# Patient Record
Sex: MALE | Race: WHITE | NOT HISPANIC OR LATINO | URBAN - METROPOLITAN AREA
[De-identification: names, ages, dates, MRNs, and addresses within clinical notes are randomized per-mention and may not be internally consistent; named-entity substitution may affect disease eponyms.]

---

## 2019-09-30 ENCOUNTER — EMERGENCY (EMERGENCY)
Facility: HOSPITAL | Age: 39
LOS: 1 days | Discharge: HOME | End: 2019-09-30
Attending: EMERGENCY MEDICINE | Admitting: EMERGENCY MEDICINE
Payer: SELF-PAY

## 2019-09-30 VITALS
HEIGHT: 70 IN | RESPIRATION RATE: 18 BRPM | TEMPERATURE: 98 F | HEART RATE: 106 BPM | SYSTOLIC BLOOD PRESSURE: 136 MMHG | WEIGHT: 164.91 LBS | DIASTOLIC BLOOD PRESSURE: 83 MMHG | OXYGEN SATURATION: 96 %

## 2019-09-30 LAB
ALBUMIN SERPL ELPH-MCNC: 4.5 G/DL — SIGNIFICANT CHANGE UP (ref 3.4–5)
ALP SERPL-CCNC: 81 U/L — SIGNIFICANT CHANGE UP (ref 40–120)
ALT FLD-CCNC: 31 U/L — SIGNIFICANT CHANGE UP (ref 12–42)
ANION GAP SERPL CALC-SCNC: 4 MMOL/L — LOW (ref 9–16)
AST SERPL-CCNC: 31 U/L — SIGNIFICANT CHANGE UP (ref 15–37)
BASOPHILS NFR BLD AUTO: 0.9 % — SIGNIFICANT CHANGE UP (ref 0–2)
BILIRUB SERPL-MCNC: 0.4 MG/DL — SIGNIFICANT CHANGE UP (ref 0.2–1.2)
BUN SERPL-MCNC: 11 MG/DL — SIGNIFICANT CHANGE UP (ref 7–23)
CALCIUM SERPL-MCNC: 9.1 MG/DL — SIGNIFICANT CHANGE UP (ref 8.5–10.5)
CHLORIDE SERPL-SCNC: 105 MMOL/L — SIGNIFICANT CHANGE UP (ref 96–108)
CO2 SERPL-SCNC: 30 MMOL/L — SIGNIFICANT CHANGE UP (ref 22–31)
CREAT SERPL-MCNC: 0.79 MG/DL — SIGNIFICANT CHANGE UP (ref 0.5–1.3)
EOSINOPHIL NFR BLD AUTO: 1.5 % — SIGNIFICANT CHANGE UP (ref 0–6)
GLUCOSE SERPL-MCNC: 93 MG/DL — SIGNIFICANT CHANGE UP (ref 70–99)
HCT VFR BLD CALC: 46.6 % — SIGNIFICANT CHANGE UP (ref 39–50)
HGB BLD-MCNC: 16.2 G/DL — SIGNIFICANT CHANGE UP (ref 13–17)
IMM GRANULOCYTES NFR BLD AUTO: 0.3 % — SIGNIFICANT CHANGE UP (ref 0–1.5)
LACTATE SERPL-SCNC: 0.7 MMOL/L — SIGNIFICANT CHANGE UP (ref 0.4–2)
LIDOCAIN IGE QN: 82 U/L — SIGNIFICANT CHANGE UP (ref 73–393)
LYMPHOCYTES # BLD AUTO: 21.3 % — SIGNIFICANT CHANGE UP (ref 13–44)
MCHC RBC-ENTMCNC: 31.7 PG — SIGNIFICANT CHANGE UP (ref 27–34)
MCHC RBC-ENTMCNC: 34.8 G/DL — SIGNIFICANT CHANGE UP (ref 32–36)
MCV RBC AUTO: 91.2 FL — SIGNIFICANT CHANGE UP (ref 80–100)
MONOCYTES NFR BLD AUTO: 8.4 % — SIGNIFICANT CHANGE UP (ref 2–14)
NEUTROPHILS NFR BLD AUTO: 67.6 % — SIGNIFICANT CHANGE UP (ref 43–77)
PCO2 BLDV: 54 MMHG — HIGH (ref 41–51)
PH BLDV: 7.37 — SIGNIFICANT CHANGE UP (ref 7.32–7.43)
PLATELET # BLD AUTO: 230 K/UL — SIGNIFICANT CHANGE UP (ref 150–400)
PO2 BLDV: 31 MMHG — LOW (ref 35–40)
POTASSIUM SERPL-MCNC: 4.4 MMOL/L — SIGNIFICANT CHANGE UP (ref 3.5–5.3)
POTASSIUM SERPL-SCNC: 4.4 MMOL/L — SIGNIFICANT CHANGE UP (ref 3.5–5.3)
PROT SERPL-MCNC: 8 G/DL — SIGNIFICANT CHANGE UP (ref 6.4–8.2)
RBC # BLD: 5.11 M/UL — SIGNIFICANT CHANGE UP (ref 4.2–5.8)
RBC # FLD: 11.9 % — SIGNIFICANT CHANGE UP (ref 10.3–14.5)
SAO2 % BLDV: 58 % — SIGNIFICANT CHANGE UP
SODIUM SERPL-SCNC: 139 MMOL/L — SIGNIFICANT CHANGE UP (ref 132–145)
WBC # BLD: 6.6 K/UL — SIGNIFICANT CHANGE UP (ref 3.8–10.5)
WBC # FLD AUTO: 6.6 K/UL — SIGNIFICANT CHANGE UP (ref 3.8–10.5)

## 2019-09-30 PROCEDURE — 99284 EMERGENCY DEPT VISIT MOD MDM: CPT

## 2019-09-30 RX ORDER — METOCLOPRAMIDE HCL 10 MG
10 TABLET ORAL ONCE
Refills: 0 | Status: COMPLETED | OUTPATIENT
Start: 2019-09-30 | End: 2019-09-30

## 2019-09-30 RX ORDER — ACETAMINOPHEN 500 MG
650 TABLET ORAL ONCE
Refills: 0 | Status: COMPLETED | OUTPATIENT
Start: 2019-09-30 | End: 2019-09-30

## 2019-09-30 RX ORDER — SODIUM CHLORIDE 9 MG/ML
1000 INJECTION INTRAMUSCULAR; INTRAVENOUS; SUBCUTANEOUS ONCE
Refills: 0 | Status: COMPLETED | OUTPATIENT
Start: 2019-09-30 | End: 2019-09-30

## 2019-09-30 RX ADMIN — Medication 30 MILLILITER(S): at 19:19

## 2019-09-30 RX ADMIN — Medication 10 MILLIGRAM(S): at 19:19

## 2019-09-30 RX ADMIN — SODIUM CHLORIDE 1000 MILLILITER(S): 9 INJECTION INTRAMUSCULAR; INTRAVENOUS; SUBCUTANEOUS at 19:20

## 2019-09-30 RX ADMIN — Medication 650 MILLIGRAM(S): at 19:19

## 2019-09-30 NOTE — ED PROVIDER NOTE - PATIENT PORTAL LINK FT
You can access the FollowMyHealth Patient Portal offered by [facility] by registering at the following website: [email or web address]. By joining [employer]’s FollowMyHealth portal, you will also be able to view your health information using other applications (apps) compatible with our system.

## 2019-09-30 NOTE — ED PROVIDER NOTE - PROGRESS NOTE DETAILS
Called Poison Control for recommendations, main things to follow up on are trouble w swallowing, chest pain, abd pain. Pt is symptom free, no distress, explained symptoms to observe for. Will DC w instrcutions

## 2019-09-30 NOTE — ED ADULT NURSE NOTE - OBJECTIVE STATEMENT
accidently swallowed Cafiza Professional Espresso . currently c/o headache and dry mouth. denies any chest pain/resp distress. well appearing, nad.

## 2019-09-30 NOTE — ED PROVIDER NOTE - CARE PROVIDER_API CALL
EMS
Collin Nicholson; MBBS)  Internal Medicine  [address] [place] [id number]  Phone: [phone]  Fax: [phone]  Follow Up Time:

## 2019-09-30 NOTE — ED ADULT NURSE NOTE - CHIEF COMPLAINT QUOTE
Patient accidently swallowed 1 big sip of espresso  1hr ago , Sodium percarbonate, c/o burning to throat and metallic taste.

## 2019-09-30 NOTE — ED ADULT NURSE NOTE - CHPI ED NUR SYMPTOMS NEG
no loss of consciousness/no nausea/no pain/no vomiting/no chills/no dizziness/no fever/no back pain/no decreased eating/drinking

## 2019-09-30 NOTE — ED ADULT NURSE NOTE - NSIMPLEMENTINTERV_GEN_ALL_ED
Implemented All Universal Safety Interventions:  [place] to call system. Call bell, personal items and telephone within reach. Instruct patient to call for assistance. Room bathroom lighting operational. Non-slip footwear when patient is off stretcher. Physically safe environment: no spills, clutter or unnecessary equipment. Stretcher in lowest position, wheels locked, appropriate side rails in place.

## 2019-09-30 NOTE — ED PROVIDER NOTE - OBJECTIVE STATEMENT
37 y/o Male with no significant PMHx presents to ED s/p ingesting cafiza, a coffee . Pt states he took one sip of the , mistaking it as coffee. After incident, pt drank water and induced vomiting. Currently 39 y/o Male with no significant PMHx presents to ED s/p ingesting cafiza, a coffee . Pt states he took one sip of the , mistaking it as coffee. After incident, pt drank water and induced vomiting. Currently having no chest pain, sob, abd pain or trouble swallowing. [facility] to  [facility] and told top come to ED for evaluation. Active component:sodium percarbonate and sodium carbonate.

## 2019-09-30 NOTE — ED PROVIDER NOTE - CLINICAL SUMMARY MEDICAL DECISION MAKING FREE TEXT BOX
39 y/o Male presents to ED s/p ingesting coffee . Consulted with poison control, who recommended symptomatic treatment. Will observe for abdominal pain, difficulty swallowing, and reassess.

## 2019-10-06 DIAGNOSIS — R11.10 VOMITING, UNSPECIFIED: ICD-10-CM

## 2019-10-06 DIAGNOSIS — T54.1X1A TOXIC EFFECT OF OTHER CORROSIVE ORGANIC COMPOUNDS, ACCIDENTAL (UNINTENTIONAL), INITIAL ENCOUNTER: ICD-10-CM

## 2024-03-21 ENCOUNTER — APPOINTMENT (EMERGENCY)
Dept: RADIOLOGY | Facility: HOSPITAL | Age: 44
End: 2024-03-21

## 2024-03-21 ENCOUNTER — HOSPITAL ENCOUNTER (EMERGENCY)
Facility: HOSPITAL | Age: 44
Discharge: HOME | End: 2024-03-21
Attending: EMERGENCY MEDICINE

## 2024-03-21 VITALS
DIASTOLIC BLOOD PRESSURE: 91 MMHG | HEART RATE: 93 BPM | SYSTOLIC BLOOD PRESSURE: 150 MMHG | TEMPERATURE: 98.7 F | RESPIRATION RATE: 20 BRPM | OXYGEN SATURATION: 96 %

## 2024-03-21 DIAGNOSIS — F41.9 ANXIETY: Primary | ICD-10-CM

## 2024-03-21 LAB
4HR DELTA HS TROPONIN: -2 NG/L
ALBUMIN SERPL BCP-MCNC: 4.8 G/DL (ref 3.5–5)
ALP SERPL-CCNC: 85 U/L (ref 34–104)
ALT SERPL W P-5'-P-CCNC: 39 U/L (ref 7–52)
ANION GAP SERPL CALCULATED.3IONS-SCNC: 10 MMOL/L (ref 4–13)
AST SERPL W P-5'-P-CCNC: 50 U/L (ref 13–39)
BASOPHILS # BLD AUTO: 0.07 THOUSANDS/ÂΜL (ref 0–0.1)
BASOPHILS NFR BLD AUTO: 1 % (ref 0–1)
BILIRUB SERPL-MCNC: 0.69 MG/DL (ref 0.2–1)
BUN SERPL-MCNC: 12 MG/DL (ref 5–25)
CALCIUM SERPL-MCNC: 9.4 MG/DL (ref 8.4–10.2)
CARDIAC TROPONIN I PNL SERPL HS: 3 NG/L
CARDIAC TROPONIN I PNL SERPL HS: 5 NG/L
CHLORIDE SERPL-SCNC: 100 MMOL/L (ref 96–108)
CO2 SERPL-SCNC: 25 MMOL/L (ref 21–32)
CREAT SERPL-MCNC: 0.84 MG/DL (ref 0.6–1.3)
EOSINOPHIL # BLD AUTO: 0.07 THOUSAND/ÂΜL (ref 0–0.61)
EOSINOPHIL NFR BLD AUTO: 1 % (ref 0–6)
ERYTHROCYTE [DISTWIDTH] IN BLOOD BY AUTOMATED COUNT: 11.7 % (ref 11.6–15.1)
GFR SERPL CREATININE-BSD FRML MDRD: 108 ML/MIN/1.73SQ M
GLUCOSE SERPL-MCNC: 84 MG/DL (ref 65–140)
HCT VFR BLD AUTO: 47.7 % (ref 36.5–49.3)
HGB BLD-MCNC: 16.6 G/DL (ref 12–17)
IMM GRANULOCYTES # BLD AUTO: 0.01 THOUSAND/UL (ref 0–0.2)
IMM GRANULOCYTES NFR BLD AUTO: 0 % (ref 0–2)
LYMPHOCYTES # BLD AUTO: 1.61 THOUSANDS/ÂΜL (ref 0.6–4.47)
LYMPHOCYTES NFR BLD AUTO: 24 % (ref 14–44)
MAGNESIUM SERPL-MCNC: 1.6 MG/DL (ref 1.9–2.7)
MCH RBC QN AUTO: 31.2 PG (ref 26.8–34.3)
MCHC RBC AUTO-ENTMCNC: 34.8 G/DL (ref 31.4–37.4)
MCV RBC AUTO: 90 FL (ref 82–98)
MONOCYTES # BLD AUTO: 0.75 THOUSAND/ÂΜL (ref 0.17–1.22)
MONOCYTES NFR BLD AUTO: 11 % (ref 4–12)
NEUTROPHILS # BLD AUTO: 4.23 THOUSANDS/ÂΜL (ref 1.85–7.62)
NEUTS SEG NFR BLD AUTO: 63 % (ref 43–75)
NRBC BLD AUTO-RTO: 0 /100 WBCS
PLATELET # BLD AUTO: 255 THOUSANDS/UL (ref 149–390)
PMV BLD AUTO: 9.8 FL (ref 8.9–12.7)
POTASSIUM SERPL-SCNC: 3.9 MMOL/L (ref 3.5–5.3)
PROT SERPL-MCNC: 8.4 G/DL (ref 6.4–8.4)
RBC # BLD AUTO: 5.32 MILLION/UL (ref 3.88–5.62)
SODIUM SERPL-SCNC: 135 MMOL/L (ref 135–147)
T4 FREE SERPL-MCNC: 0.75 NG/DL (ref 0.61–1.12)
TSH SERPL DL<=0.05 MIU/L-ACNC: 5.55 UIU/ML (ref 0.45–4.5)
WBC # BLD AUTO: 6.74 THOUSAND/UL (ref 4.31–10.16)

## 2024-03-21 PROCEDURE — 84443 ASSAY THYROID STIM HORMONE: CPT | Performed by: PHYSICIAN ASSISTANT

## 2024-03-21 PROCEDURE — 93005 ELECTROCARDIOGRAM TRACING: CPT

## 2024-03-21 PROCEDURE — 83735 ASSAY OF MAGNESIUM: CPT | Performed by: PHYSICIAN ASSISTANT

## 2024-03-21 PROCEDURE — 80053 COMPREHEN METABOLIC PANEL: CPT | Performed by: PHYSICIAN ASSISTANT

## 2024-03-21 PROCEDURE — 36415 COLL VENOUS BLD VENIPUNCTURE: CPT | Performed by: PHYSICIAN ASSISTANT

## 2024-03-21 PROCEDURE — 84484 ASSAY OF TROPONIN QUANT: CPT | Performed by: PHYSICIAN ASSISTANT

## 2024-03-21 PROCEDURE — 85025 COMPLETE CBC W/AUTO DIFF WBC: CPT | Performed by: PHYSICIAN ASSISTANT

## 2024-03-21 PROCEDURE — 99283 EMERGENCY DEPT VISIT LOW MDM: CPT

## 2024-03-21 PROCEDURE — 96375 TX/PRO/DX INJ NEW DRUG ADDON: CPT

## 2024-03-21 PROCEDURE — 96365 THER/PROPH/DIAG IV INF INIT: CPT

## 2024-03-21 PROCEDURE — 99285 EMERGENCY DEPT VISIT HI MDM: CPT | Performed by: PHYSICIAN ASSISTANT

## 2024-03-21 PROCEDURE — 84439 ASSAY OF FREE THYROXINE: CPT | Performed by: PHYSICIAN ASSISTANT

## 2024-03-21 PROCEDURE — 71045 X-RAY EXAM CHEST 1 VIEW: CPT

## 2024-03-21 PROCEDURE — NC001 PR NO CHARGE: Performed by: EMERGENCY MEDICINE

## 2024-03-21 RX ORDER — HYDROXYZINE HYDROCHLORIDE 25 MG/1
25 TABLET, FILM COATED ORAL EVERY 6 HOURS
Qty: 20 TABLET | Refills: 0 | Status: SHIPPED | OUTPATIENT
Start: 2024-03-21 | End: 2024-03-26

## 2024-03-21 RX ORDER — MAGNESIUM SULFATE HEPTAHYDRATE 40 MG/ML
2 INJECTION, SOLUTION INTRAVENOUS ONCE
Status: COMPLETED | OUTPATIENT
Start: 2024-03-21 | End: 2024-03-21

## 2024-03-21 RX ORDER — LORAZEPAM 2 MG/ML
1 INJECTION INTRAMUSCULAR ONCE
Status: COMPLETED | OUTPATIENT
Start: 2024-03-21 | End: 2024-03-21

## 2024-03-21 RX ADMIN — MAGNESIUM SULFATE HEPTAHYDRATE 2 G: 40 INJECTION, SOLUTION INTRAVENOUS at 07:08

## 2024-03-21 RX ADMIN — LORAZEPAM 1 MG: 2 INJECTION INTRAMUSCULAR; INTRAVENOUS at 06:26

## 2024-03-21 NOTE — DISCHARGE INSTRUCTIONS
You can use this link to find a psychiatrist within the [facility]. I placed a referral for you which is in the system already. [email or web address]    Try calling the number on the back of your insurance card to find psychiatrists and therapists who accept your insurance. You can also try [email or web address] or [email or web address] . If you develop any severe anxiety, depression, suicidal or homicidal thoughts, please go to your closest emergency department as soon as possible.

## 2024-03-21 NOTE — ED PROVIDER NOTES
History  Chief Complaint   Patient presents with    Anxiety     Dx of anxiety, stopped taking medication Lexapro. Feels chest tightness, tingling sensation in fingers, insomnia. States unable to function at work and home. Feels like losing control        44yo male with a history of anxiety presenting for increasing anxiety over the past several months.  Patient reports multiple recent stressors including the loss of a relationship and the loss of a job.  Patient reports that his severe anxiety caused him to lose his previous job.  He relocated from [place] and is now moved in with his parents in the [facility].  He is having increased panic attacks and difficulty sleeping. He has chest tightness and bilateral hand tingling with his panic attacks. Patient states he has not had a good sleep in several months and has to drink alcohol frequently to fall asleep. He is worried that he will lose his current job due to his anxiety and he cannot afford to lose his job due to his finances. He called the crisis hotline 6x yesterday due to his symptoms. He was previously taking Lexapro (unsure of the dose), PRN Xanax, and another medication to help with sleep. He stopped the medications in November because he could not afford them. He denies any SI.      History provided by:  Patient   used: No    Anxiety  Presenting symptoms: no suicidal thoughts    Associated symptoms: anxiety    Associated symptoms: no abdominal pain and no chest pain        None       Past Medical History:   Diagnosis Date    Anxiety        History reviewed. No pertinent surgical history.    History reviewed. No pertinent family history.  I have reviewed and agree with the history as documented.    E-Cigarette/Vaping     E-Cigarette/Vaping Substances    Nicotine No     THC No     CBD No     Flavoring No     Other No     Unknown No      Social History     Tobacco Use    Smoking status: Every Day     Types: Cigarettes    Smokeless tobacco:  Never   Substance Use Topics    Alcohol use: Yes     Comment: socially    Drug use: Not Currently       Review of Systems   Constitutional:  Negative for chills and fever.   HENT:  Negative for drooling and voice change.    Eyes:  Negative for discharge and redness.   Respiratory:  Positive for chest tightness. Negative for shortness of breath and stridor.    Cardiovascular:  Negative for chest pain and leg swelling.   Gastrointestinal:  Negative for abdominal pain and vomiting.   Musculoskeletal:  Negative for neck pain and neck stiffness.   Skin:  Negative for color change and rash.   Neurological:  Negative for seizures and syncope.   Psychiatric/Behavioral:  Positive for sleep disturbance. Negative for confusion and suicidal ideas. The patient is nervous/anxious.    All other systems reviewed and are negative.      Physical Exam  Physical Exam  Vitals and nursing note reviewed.   Constitutional:       General: He is not in acute distress.     Appearance: He is well-developed. He is not diaphoretic.   HENT:      Head: Normocephalic and atraumatic.      Right Ear: External ear normal.      Left Ear: External ear normal.   Eyes:      General: No scleral icterus.        Right eye: No discharge.         Left eye: No discharge.      Conjunctiva/sclera: Conjunctivae normal.   Cardiovascular:      Rate and Rhythm: Normal rate and regular rhythm.      Heart sounds: Normal heart sounds. No murmur heard.  Pulmonary:      Effort: Pulmonary effort is normal. No respiratory distress.      Breath sounds: Normal breath sounds. No stridor. No wheezing or rales.   Musculoskeletal:         General: No deformity. Normal range of motion.      Cervical back: Normal range of motion and neck supple.   Skin:     General: Skin is warm and dry.   Neurological:      Mental Status: He is alert. He is not disoriented.      GCS: GCS eye subscore is 4. GCS verbal subscore is 5. GCS motor subscore is 6.   Psychiatric:         Mood and Affect:  "Mood is anxious.         Behavior: Behavior normal.         Thought Content: Thought content is not delusional. Thought content does not include suicidal ideation. Thought content does not include suicidal plan.         Vital Signs  ED Triage Vitals [03/21/24 0520]   Temperature Pulse Respirations Blood Pressure SpO2   98.7 °F (37.1 °C) 90 18 160/97 99 %      Temp Source Heart Rate Source Patient Position - Orthostatic VS BP Location FiO2 (%)   Temporal Monitor Sitting Left arm --      Pain Score       2           Vitals:    03/21/24 0520 03/21/24 0717 03/21/24 1052   BP: 160/97 137/93 150/91   Pulse: 90 84 93   Patient Position - Orthostatic VS: Sitting  Lying         Visual Acuity      ED Medications  Medications   LORazepam (ATIVAN) injection 1 mg (1 mg Intravenous Given 3/21/24 0626)   magnesium sulfate 2 g/50 mL IVPB (premix) 2 g (0 g Intravenous Stopped 3/21/24 0808)       Diagnostic Studies  Results Reviewed       Procedure Component Value Units Date/Time    T4, free [[id number]]  (Normal) Collected: 03/21/24 0621    Lab Status: Final result Specimen: Blood from Arm, Left Updated: 03/21/24 1341     Free T4 0.75 ng/dL     Narrative:        \"Therapeutic range for patients medicated with thyroid disorders: 0.61-1.24 ng/dL.\"    HS Troponin I 4hr [[id number]]  (Normal) Collected: 03/21/24 0854    Lab Status: Final result Specimen: Blood from Arm, Left Updated: 03/21/24 0921     hs TnI 4hr 3 ng/L      Delta 4hr hsTnI -2 ng/L     TSH, 3rd generation with Free T4 reflex [[id number]]  (Abnormal) Collected: 03/21/24 0621    Lab Status: Final result Specimen: Blood from Arm, Left Updated: 03/21/24 0707     TSH 3RD GENERATON 5.553 uIU/mL     HS Troponin 0hr (reflex protocol) [[id number]]  (Normal) Collected: 03/21/24 0621    Lab Status: Final result Specimen: Blood from Arm, Left Updated: 03/21/24 0656     hs TnI 0hr 5 ng/L     Comprehensive metabolic panel [[id number]]  (Abnormal) Collected: 03/21/24 0621    Lab Status: " Final result Specimen: Blood from Arm, Left Updated: 03/21/24 0648     Sodium 135 mmol/L      Potassium 3.9 mmol/L      Chloride 100 mmol/L      CO2 25 mmol/L      ANION GAP 10 mmol/L      BUN 12 mg/dL      Creatinine 0.84 mg/dL      Glucose 84 mg/dL      Calcium 9.4 mg/dL      AST 50 U/L      ALT 39 U/L      Alkaline Phosphatase 85 U/L      Total Protein 8.4 g/dL      Albumin 4.8 g/dL      Total Bilirubin 0.69 mg/dL      eGFR 108 ml/min/1.73sq m     Narrative:      National Kidney Disease Foundation guidelines for Chronic Kidney Disease (CKD):     Stage 1 with normal or high GFR (GFR > 90 mL/min/1.73 square meters)    Stage 2 Mild CKD (GFR = 60-89 mL/min/1.73 square meters)    Stage 3A Moderate CKD (GFR = 45-59 mL/min/1.73 square meters)    Stage 3B Moderate CKD (GFR = 30-44 mL/min/1.73 square meters)    Stage 4 Severe CKD (GFR = 15-29 mL/min/1.73 square meters)    Stage 5 End Stage CKD (GFR <15 mL/min/1.73 square meters)  Note: GFR calculation is accurate only with a steady state creatinine    Magnesium [[id number]]  (Abnormal) Collected: 03/21/24 0621    Lab Status: Final result Specimen: Blood from Arm, Left Updated: 03/21/24 0648     Magnesium 1.6 mg/dL     CBC and differential [[id number]] Collected: 03/21/24 0621    Lab Status: Final result Specimen: Blood from Arm, Left Updated: 03/21/24 0631     WBC 6.74 Thousand/uL      RBC 5.32 Million/uL      Hemoglobin 16.6 g/dL      Hematocrit 47.7 %      MCV 90 fL      MCH 31.2 pg      MCHC 34.8 g/dL      RDW 11.7 %      MPV 9.8 fL      Platelets 255 Thousands/uL      nRBC 0 /100 WBCs      Neutrophils Relative 63 %      Immature Grans % 0 %      Lymphocytes Relative 24 %      Monocytes Relative 11 %      Eosinophils Relative 1 %      Basophils Relative 1 %      Neutrophils Absolute 4.23 Thousands/µL      Absolute Immature Grans 0.01 Thousand/uL      Absolute Lymphocytes 1.61 Thousands/µL      Absolute Monocytes 0.75 Thousand/µL      Eosinophils Absolute 0.07 Thousand/µL       Basophils Absolute 0.07 Thousands/µL                    XR chest 1 view portable   ED Interpretation by Shira Rogers PA-C (03/21 0630)   No acute abnormality      Final Result by Latrell Hyman MD (03/21 1007)      No acute cardiopulmonary disease.            Workstation performed: [id number]                    Procedures  ECG 12 Lead Documentation Only    Date/Time: 3/21/2024 6:34 AM    Performed by: Shira Rogers PA-C  Authorized by: Shira Rogers PA-C    Indications / Diagnosis:  Anxiety  ECG reviewed by me, the ED Provider: yes    Patient location:  ED  Rate:     ECG rate:  82    ECG rate assessment: normal    Rhythm:     Rhythm: sinus rhythm    Ectopy:     Ectopy: none    QRS:     QRS axis:  Normal  Conduction:     Conduction: normal    ST segments:     ST segments:  Normal  T waves:     T waves: normal             ED Course  ED Course as of 03/21/24 1705   Thu Mar 21, 2024   0900 Crisis at bedside.    0918 Patient evaluated by crisis. Patient not interested in inpatient treatment. Crisis recommending psych consult for medication recommendations.    1224 Awaiting psych consult.    1302 Psychiatrist refusing to complete consult because they do not start psychiatric medications in the ED unless patients are awaiting inpatient placement. They recommend f/u with outpatient psych.                     SBIRT 20yo+      Flowsheet Row Most Recent Value   Initial Alcohol Screen: US AUDIT-C     1. How often do you have a drink containing alcohol? 0 Filed at: 03/21/2024 1021   2. How many drinks containing alcohol do you have on a typical day you are drinking?  0 Filed at: 03/21/2024 1021   3a. Male UNDER 65: How often do you have five or more drinks on one occasion? 0 Filed at: 03/21/2024 1021   3b. FEMALE Any Age, or MALE 65+: How often do you have 4 or more drinks on one occassion? 0 Filed at: 03/21/2024 1021   Audit-C Score 0 Filed at: 03/21/2024 1021   CHRISTEL: How many times in the past year  have you...    Used an illegal drug or used a prescription medication for non-medical reasons? Never Filed at: 03/21/2024 1021                      Medical Decision Making  43yoM here with increasing anxiety and panic attacks. C/o difficulty sleeping. He stopped his medications several months ago due to not being able to afford them. Recently moved to [place] and does not have a PCP. No SI/HI. Vitals are stable. He appears anxious. No signs of psychosis.    Initial ED plan: Check cardiac labs, TSH, magnesium, EKG, and CXR. IV Ativan and reassess. Will consult crisis.    Final assessment: Labs reveal a magnesium of 1.6 which was replaced. TSH mildly elevated at 5.5, free T4 pending. Remainder of labs unremarkable including normal troponin. No ischemic changes on EKG and CXR is clear. Patient evaluated by crisis. Patient not interested in inpatient treatment at this time and there are no grounds for 302. Patient mainly interested in starting medications. Psychiatry initially consulted to assist with medication initiation but they will not see patient as they only start medications if someone is awaiting inpatient tx/placement. Will prescribe hydroxyzine. Outpatient mental health resources provided including crisis hotline. Strict ED return precautions discussed. He was also encouraged to return to the ED if he decides to purse inpatient treatment. Patient expressed understanding and is agreeable to plan. Patient discharged in stable condition.        Problems Addressed:  Anxiety: acute illness or injury    Amount and/or Complexity of Data Reviewed  Labs: ordered.  Radiology: ordered and independent interpretation performed.  ECG/medicine tests: ordered and independent interpretation performed.    Risk  Prescription drug management.             Disposition  Final diagnoses:   Anxiety     Time reflects when diagnosis was documented in both MDM as applicable and the Disposition within this note       Time User Action Codes  Description Comment    3/21/2024  9:17 AM Oksana Tyson [F41.9] Anxiety           ED Disposition       ED Disposition   Discharge    Condition   Stable    Date/Time   Thu Mar 21, 2024 1303    Comment   Giancarlo Erickson discharge to home/self care.                   Follow-up Information       Follow up With Specialties Details Why Contact Info Additional Information    [facility] Schedule an appointment as soon as possible for a visit   [address]  [facility] [address] [phone] [facility], [address], [place], [place], [address] [phone]    [facility] Emergency Department Emergency Medicine  If symptoms worsen [address] [facility] [id number] [phone] [facility], [address] [place], [place], [address]            Discharge Medication List as of 3/21/2024  1:04 PM        START taking these medications    Details   hydrOXYzine HCL (ATARAX) 25 mg tablet Take 1 tablet (25 mg total) by mouth every 6 (six) hours for 5 days, Starting Thu 3/21/2024, Until Tue 3/26/2024, Normal                 PDMP Review       None            ED Provider  Electronically Signed by             Shira Rogers PA-C  03/21/24 7138

## 2024-03-21 NOTE — Clinical Note
Giancarlo Erickson was seen and treated in our emergency department on 3/21/2024.    No restrictions            Diagnosis:     Giancarlo  may return to work on return date.    He may return on this date: 03/23/2024         If you have any questions or concerns, please don't hesitate to call.      Shira Rogers PA-C    ______________________________           _______________          _______________  Hospital Representative                              Date                                Time
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
## 2024-03-22 LAB
ATRIAL RATE: 82 BPM
ATRIAL RATE: 98 BPM
P AXIS: 53 DEGREES
P AXIS: 69 DEGREES
PR INTERVAL: 134 MS
PR INTERVAL: 146 MS
QRS AXIS: 60 DEGREES
QRS AXIS: 72 DEGREES
QRSD INTERVAL: 102 MS
QRSD INTERVAL: 96 MS
QT INTERVAL: 350 MS
QT INTERVAL: 362 MS
QTC INTERVAL: 422 MS
QTC INTERVAL: 446 MS
T WAVE AXIS: 58 DEGREES
T WAVE AXIS: 66 DEGREES
VENTRICULAR RATE: 82 BPM
VENTRICULAR RATE: 98 BPM

## 2024-03-22 PROCEDURE — 93010 ELECTROCARDIOGRAM REPORT: CPT | Performed by: INTERNAL MEDICINE

## 2024-03-28 NOTE — ED PROVIDER NOTES
On Thursday 3/28/2024 at approximately 17:30, patient called the ED to file a complaint from his and recent ED visit from 3/21/2024.  He referenced a long wait time.  Transferred call to nurse from leadership team, Kristina Cheek, who attempted to obtain information to document his complaint however patient hung up the phone on nursing.      Randa Stanford,   03/28/24 1736

## 2024-04-12 ENCOUNTER — TELEPHONE (OUTPATIENT)
Dept: PSYCHIATRY | Facility: CLINIC | Age: 44
End: 2024-04-12

## 2024-10-09 ENCOUNTER — TELEPHONE (OUTPATIENT)
Age: 44
End: 2024-10-09

## 2024-10-09 NOTE — TELEPHONE ENCOUNTER
"Patient called in regard to wanting to speak to supervisor and requesting a \"workflow organization chart for psychiatry\" Writer tried to explain that a msg can be sent to supervisor for call back. Patient did not agree with that answer and wanted to speak to supervisor right now. Writer contacted supervisor during call and supervisor will call patient back. Writer relayed that to patient and was not ok with this. Patient continued to argue/belligerent/belittling. Patient would not confirm call back number and continued to argue because he wanted to speak to someone at time of call. Patient stated he was going to make another phone call. Writer ended call.    "

## 2024-10-09 NOTE — TELEPHONE ENCOUNTER
I called pt back and asked if he was looking for services.     Pt states he is looking for an org chart for our  service line so he can take to his  to let them know what an evil company we have.   I told him that was not something we supply that he could go tho our web site and look at the providers in our department.   He stated again he was not looking for services and I am like the rest them hiding things from the public.    I told him I was sorry confirmed again that he did not want services and I would document this call and what he was looking for.